# Patient Record
Sex: FEMALE | Race: WHITE | URBAN - METROPOLITAN AREA
[De-identification: names, ages, dates, MRNs, and addresses within clinical notes are randomized per-mention and may not be internally consistent; named-entity substitution may affect disease eponyms.]

---

## 2017-01-13 ENCOUNTER — TRANSFERRED RECORDS (OUTPATIENT)
Dept: HEALTH INFORMATION MANAGEMENT | Facility: CLINIC | Age: 42
End: 2017-01-13

## 2017-02-24 ENCOUNTER — TRANSFERRED RECORDS (OUTPATIENT)
Dept: HEALTH INFORMATION MANAGEMENT | Facility: CLINIC | Age: 42
End: 2017-02-24

## 2017-02-27 ENCOUNTER — TRANSFERRED RECORDS (OUTPATIENT)
Dept: HEALTH INFORMATION MANAGEMENT | Facility: CLINIC | Age: 42
End: 2017-02-27

## 2017-03-03 ENCOUNTER — TRANSFERRED RECORDS (OUTPATIENT)
Dept: HEALTH INFORMATION MANAGEMENT | Facility: CLINIC | Age: 42
End: 2017-03-03

## 2017-03-05 ENCOUNTER — TRANSFERRED RECORDS (OUTPATIENT)
Dept: HEALTH INFORMATION MANAGEMENT | Facility: CLINIC | Age: 42
End: 2017-03-05

## 2017-05-03 ENCOUNTER — TRANSFERRED RECORDS (OUTPATIENT)
Dept: HEALTH INFORMATION MANAGEMENT | Facility: CLINIC | Age: 42
End: 2017-05-03

## 2017-05-15 ENCOUNTER — MEDICAL CORRESPONDENCE (OUTPATIENT)
Dept: HEALTH INFORMATION MANAGEMENT | Facility: CLINIC | Age: 42
End: 2017-05-15

## 2017-05-16 ENCOUNTER — CARE COORDINATION (OUTPATIENT)
Dept: GASTROENTEROLOGY | Facility: CLINIC | Age: 42
End: 2017-05-16

## 2017-05-16 DIAGNOSIS — R10.9 ABDOMINAL PAIN: Primary | ICD-10-CM

## 2017-05-16 NOTE — LETTER
May 16, 2017       TO: Jeny Burciaga  [address] [facility]  [place] [address]         Dear Jeny,    Endoscopic Ultrasound Instructions    You are scheduled for and Endoscopic Ultrasound (EUS) with Dr. Steven on June 22, 2017. This will be in the [facility] hospital in the Operating Room on the 3rd floor at 7:30 a.m. .   You will need to arrive for your procedure at 5:30 a.m..    Please arrive at this address:  [address] [address]      EUS - is an ultrasound examination of the upper part of your gastro-intestinal (GI) tract and surrounding organs. This includes the esophagus (food tube), stomach, duodenum(first part of the bowel), the liver, pancreas, gallbladder, spleen, and lymph nodes in this area.     For scheduling questions or questions regarding procedure or the prep please call Endoscopy at [phone].     The following will need to be completed for the EUS:     1. Nothing by mouth (eat or drink) for 6-8 hours prior to exam.  2. You must have a  present the day of the exam, cabs and buses are not accepted as rides. If you do not have a proper ride at the time of the exam your exam will be canceled and rescheduled.   3. If you are currently taking a blood thinner: It is recommended that you stop taking your Coumadin/Warfarin or Plavix at least 5 days prior to exam, as long as it is ok with the prescribing MD.  If you are taking aspirin we prefer stopping the medication 3 days prior to procedure. Please check with your provider.  - If your MD switches you to Lovenox prior to exam we prefer the PM dose the evening before the exam and the AM dose the day of the exam are held.   4. If you are a diabetic please ask your primary care provider about adjusting your insulin for the procedure as you will not be able to eat or drink prior to the procedure.   5. If you have medications that need to be taken the morning of the procedure,   please take with small sips of water.   It is an anesthesia requirement to have a PRE-OP PHYSICAL by your Primary Care Clinic before receiving GENERAL ANESTHESIA. Without it, your procedure will be delayed or cancelled. The physical can be faxed to [phone].    Pre-Op Physical Fax Numbers:             [facility] Pre-Admissions      [address]      Fax: [phone]      Phone: [phone]      FOLLOW-UP, POST PROCEDURE:     Activity:  You should not drive, operate machinery, make important decisions, or do activities that require coordination or balance until the following day.    Abdominal Cramping:  It is normal to have mild cramping after the study.  This is due to air and water put into the intestines during the ultrasound.  Walking or turning side to side will usually help the air to pass.  Call if the pain is severe.    Sore Throat:  You may have a sore throat for one to two days.  Throat lozenges or warm saltwater gargles may help.  Mix   teaspoon salt in a glass of warm water, gargle, and then spit.    Feel free to contact me with any additional questions or concerns prior to this exam.  Cristela Verduzco RN   BSN, HNBC, STAR-T  Surgical Oncology and GI Service  Care Coordinator  Ph: [phone]

## 2017-05-16 NOTE — PROGRESS NOTES
Care Coordination New Patient Referral  Surgical Oncology and GI    Advanced Endoscopy Procedure Intake form:     Referring/Requesting provider and Health care System: Dr. Padma Bacon/[facility]     Clinic contact - Name, Phone and Fax number: [place] @ [phone]     Procedure Requested: EUS     Requested provided (if specified): Dr. Steven     Is this a procedure the provider does? (Look at procedure list): Yes  - inform referring clinic requested provider does not do this procedure and that it will be referred by provider that does offer the procedure.     Has patient been evaluated in clinic or had a procedure Advance Endoscopy provider in the last 5 years: No     Indication/Reason for procedure: Ongoing Abdominal Pain, Nausea     Is procedure to rule out malignancy or is there concern for underlying malignancy (if yes, send messages as High priority): Unknown     Requested urgency of procedure: Within the next month     History and physical within last 30 days? Yes-May 3rd     Has patient had any imaging of chest/abdominal completed? Yes- CT of abdomen and pelvis on 2/27/17     5/15/17 Dr. Steven has reviewed information and we will proceed with scheduling EUS first available time in the OR.  Dr. Steven will need to screen duodenum major papilla given greater than 50 colon polyps.    We will contact the patient to let them know the date and time of procedure.    Cristela Verduzco  BSN, HNBC  RN Care Coordinator  Surgical Oncology  Ph: [phone]  Email: [email or web address]

## 2017-06-21 ENCOUNTER — ANESTHESIA EVENT (OUTPATIENT)
Dept: SURGERY | Facility: CLINIC | Age: 42
End: 2017-06-21
Payer: COMMERCIAL

## 2017-06-21 RX ORDER — CETIRIZINE HYDROCHLORIDE 10 MG/1
10 TABLET ORAL DAILY PRN
COMMUNITY

## 2017-06-22 ENCOUNTER — ANESTHESIA (OUTPATIENT)
Dept: SURGERY | Facility: CLINIC | Age: 42
End: 2017-06-22
Payer: COMMERCIAL

## 2017-06-22 ENCOUNTER — HOSPITAL ENCOUNTER (OUTPATIENT)
Facility: CLINIC | Age: 42
Discharge: HOME | End: 2017-06-22
Attending: INTERNAL MEDICINE | Admitting: INTERNAL MEDICINE
Payer: COMMERCIAL

## 2017-06-22 VITALS
BODY MASS INDEX: 35.99 KG/M2 | TEMPERATURE: 97 F | RESPIRATION RATE: 16 BRPM | OXYGEN SATURATION: 95 % | SYSTOLIC BLOOD PRESSURE: 94 MMHG | DIASTOLIC BLOOD PRESSURE: 65 MMHG | HEIGHT: 67 IN | WEIGHT: 229.28 LBS | HEART RATE: 70 BPM

## 2017-06-22 PROBLEM — T88.52XA FAILED CONSCIOUS SEDATION DURING PROCEDURE: Status: ACTIVE | Noted: 2017-06-22

## 2017-06-22 LAB
ALBUMIN SERPL-MCNC: 3.5 G/DL (ref 3.4–5)
ALP SERPL-CCNC: 74 U/L (ref 40–150)
ALT SERPL W P-5'-P-CCNC: 32 U/L (ref 0–50)
ANION GAP SERPL CALCULATED.3IONS-SCNC: 4 MMOL/L (ref 3–14)
AST SERPL W P-5'-P-CCNC: 16 U/L (ref 0–45)
BILIRUB SERPL-MCNC: 0.3 MG/DL (ref 0.2–1.3)
BUN SERPL-MCNC: 7 MG/DL (ref 7–30)
CALCIUM SERPL-MCNC: 8.6 MG/DL (ref 8.5–10.1)
CHLORIDE SERPL-SCNC: 105 MMOL/L (ref 94–109)
CO2 SERPL-SCNC: 29 MMOL/L (ref 20–32)
CREAT SERPL-MCNC: 0.82 MG/DL (ref 0.52–1.04)
ERYTHROCYTE [DISTWIDTH] IN BLOOD BY AUTOMATED COUNT: 13.2 % (ref 10–15)
GFR SERPL CREATININE-BSD FRML MDRD: 77 ML/MIN/1.7M2
GLUCOSE SERPL-MCNC: 93 MG/DL (ref 70–99)
HCG UR QL: NEGATIVE
HCT VFR BLD AUTO: 38.2 % (ref 35–47)
HGB BLD-MCNC: 12.5 G/DL (ref 11.7–15.7)
INR PPP: 0.88 (ref 0.86–1.14)
MCH RBC QN AUTO: 30.3 PG (ref 26.5–33)
MCHC RBC AUTO-ENTMCNC: 32.7 G/DL (ref 31.5–36.5)
MCV RBC AUTO: 93 FL (ref 78–100)
PLATELET # BLD AUTO: 207 10E9/L (ref 150–450)
POTASSIUM SERPL-SCNC: 4 MMOL/L (ref 3.4–5.3)
PROT SERPL-MCNC: 7.3 G/DL (ref 6.8–8.8)
RBC # BLD AUTO: 4.13 10E12/L (ref 3.8–5.2)
SODIUM SERPL-SCNC: 138 MMOL/L (ref 133–144)
UPPER EUS: NORMAL
WBC # BLD AUTO: 5.8 10E9/L (ref 4–11)

## 2017-06-22 PROCEDURE — 25000125 ZZHC RX 250: Performed by: NURSE ANESTHETIST, CERTIFIED REGISTERED

## 2017-06-22 PROCEDURE — 25000125 ZZHC RX 250: Performed by: ANESTHESIOLOGY

## 2017-06-22 PROCEDURE — 36000053 ZZH SURGERY LEVEL 2 EA 15 ADDTL MIN - UMMC: Performed by: INTERNAL MEDICINE

## 2017-06-22 PROCEDURE — 27210794 ZZH OR GENERAL SUPPLY STERILE: Performed by: INTERNAL MEDICINE

## 2017-06-22 PROCEDURE — 25000128 H RX IP 250 OP 636: Performed by: NURSE ANESTHETIST, CERTIFIED REGISTERED

## 2017-06-22 PROCEDURE — C9399 UNCLASSIFIED DRUGS OR BIOLOG: HCPCS | Performed by: NURSE ANESTHETIST, CERTIFIED REGISTERED

## 2017-06-22 PROCEDURE — 25000125 ZZHC RX 250: Performed by: INTERNAL MEDICINE

## 2017-06-22 PROCEDURE — 85610 PROTHROMBIN TIME: CPT | Performed by: INTERNAL MEDICINE

## 2017-06-22 PROCEDURE — 81025 URINE PREGNANCY TEST: CPT | Performed by: ANESTHESIOLOGY

## 2017-06-22 PROCEDURE — 25000132 ZZH RX MED GY IP 250 OP 250 PS 637: Performed by: ANESTHESIOLOGY

## 2017-06-22 PROCEDURE — 71000014 ZZH RECOVERY PHASE 1 LEVEL 2 FIRST HR: Performed by: INTERNAL MEDICINE

## 2017-06-22 PROCEDURE — 25000566 ZZH SEVOFLURANE, EA 15 MIN: Performed by: INTERNAL MEDICINE

## 2017-06-22 PROCEDURE — 71000027 ZZH RECOVERY PHASE 2 EACH 15 MINS: Performed by: INTERNAL MEDICINE

## 2017-06-22 PROCEDURE — 85027 COMPLETE CBC AUTOMATED: CPT | Performed by: INTERNAL MEDICINE

## 2017-06-22 PROCEDURE — 88305 TISSUE EXAM BY PATHOLOGIST: CPT | Performed by: INTERNAL MEDICINE

## 2017-06-22 PROCEDURE — 25000128 H RX IP 250 OP 636: Performed by: ANESTHESIOLOGY

## 2017-06-22 PROCEDURE — 36415 COLL VENOUS BLD VENIPUNCTURE: CPT | Performed by: INTERNAL MEDICINE

## 2017-06-22 PROCEDURE — 80053 COMPREHEN METABOLIC PANEL: CPT | Performed by: INTERNAL MEDICINE

## 2017-06-22 PROCEDURE — 36000051 ZZH SURGERY LEVEL 2 1ST 30 MIN - UMMC: Performed by: INTERNAL MEDICINE

## 2017-06-22 PROCEDURE — 37000009 ZZH ANESTHESIA TECHNICAL FEE, EACH ADDTL 15 MIN: Performed by: INTERNAL MEDICINE

## 2017-06-22 PROCEDURE — 37000008 ZZH ANESTHESIA TECHNICAL FEE, 1ST 30 MIN: Performed by: INTERNAL MEDICINE

## 2017-06-22 PROCEDURE — 71000015 ZZH RECOVERY PHASE 1 LEVEL 2 EA ADDTL HR: Performed by: INTERNAL MEDICINE

## 2017-06-22 PROCEDURE — 40000170 ZZH STATISTIC PRE-PROCEDURE ASSESSMENT II: Performed by: INTERNAL MEDICINE

## 2017-06-22 RX ORDER — LIDOCAINE 40 MG/G
CREAM TOPICAL
Status: DISCONTINUED | OUTPATIENT
Start: 2017-06-22 | End: 2017-06-22 | Stop reason: HOSPADM

## 2017-06-22 RX ORDER — GLYCOPYRROLATE 0.2 MG/ML
INJECTION, SOLUTION INTRAMUSCULAR; INTRAVENOUS PRN
Status: DISCONTINUED | OUTPATIENT
Start: 2017-06-22 | End: 2017-06-22

## 2017-06-22 RX ORDER — PROPOFOL 10 MG/ML
INJECTION, EMULSION INTRAVENOUS PRN
Status: DISCONTINUED | OUTPATIENT
Start: 2017-06-22 | End: 2017-06-22

## 2017-06-22 RX ORDER — FENTANYL CITRATE 50 UG/ML
INJECTION, SOLUTION INTRAMUSCULAR; INTRAVENOUS PRN
Status: DISCONTINUED | OUTPATIENT
Start: 2017-06-22 | End: 2017-06-22

## 2017-06-22 RX ORDER — MEPERIDINE HYDROCHLORIDE 25 MG/ML
12.5 INJECTION INTRAMUSCULAR; INTRAVENOUS; SUBCUTANEOUS
Status: DISCONTINUED | OUTPATIENT
Start: 2017-06-22 | End: 2017-06-22 | Stop reason: HOSPADM

## 2017-06-22 RX ORDER — NALOXONE HYDROCHLORIDE 0.4 MG/ML
.1-.4 INJECTION, SOLUTION INTRAMUSCULAR; INTRAVENOUS; SUBCUTANEOUS
Status: DISCONTINUED | OUTPATIENT
Start: 2017-06-22 | End: 2017-06-22 | Stop reason: HOSPADM

## 2017-06-22 RX ORDER — ONDANSETRON 2 MG/ML
INJECTION INTRAMUSCULAR; INTRAVENOUS PRN
Status: DISCONTINUED | OUTPATIENT
Start: 2017-06-22 | End: 2017-06-22

## 2017-06-22 RX ORDER — FLUMAZENIL 0.1 MG/ML
0.2 INJECTION, SOLUTION INTRAVENOUS
Status: DISCONTINUED | OUTPATIENT
Start: 2017-06-22 | End: 2017-06-22 | Stop reason: HOSPADM

## 2017-06-22 RX ORDER — ONDANSETRON 4 MG/1
4 TABLET, ORALLY DISINTEGRATING ORAL EVERY 30 MIN PRN
Status: DISCONTINUED | OUTPATIENT
Start: 2017-06-22 | End: 2017-06-22 | Stop reason: HOSPADM

## 2017-06-22 RX ORDER — NEOSTIGMINE METHYLSULFATE 1 MG/ML
VIAL (ML) INJECTION PRN
Status: DISCONTINUED | OUTPATIENT
Start: 2017-06-22 | End: 2017-06-22

## 2017-06-22 RX ORDER — SODIUM CHLORIDE, SODIUM LACTATE, POTASSIUM CHLORIDE, CALCIUM CHLORIDE 600; 310; 30; 20 MG/100ML; MG/100ML; MG/100ML; MG/100ML
INJECTION, SOLUTION INTRAVENOUS CONTINUOUS
Status: DISCONTINUED | OUTPATIENT
Start: 2017-06-22 | End: 2017-06-22 | Stop reason: HOSPADM

## 2017-06-22 RX ORDER — ONDANSETRON 2 MG/ML
4 INJECTION INTRAMUSCULAR; INTRAVENOUS EVERY 30 MIN PRN
Status: DISCONTINUED | OUTPATIENT
Start: 2017-06-22 | End: 2017-06-22 | Stop reason: HOSPADM

## 2017-06-22 RX ORDER — FENTANYL CITRATE 50 UG/ML
25-50 INJECTION, SOLUTION INTRAMUSCULAR; INTRAVENOUS
Status: DISCONTINUED | OUTPATIENT
Start: 2017-06-22 | End: 2017-06-22 | Stop reason: HOSPADM

## 2017-06-22 RX ORDER — LEVOFLOXACIN 5 MG/ML
INJECTION, SOLUTION INTRAVENOUS PRN
Status: DISCONTINUED | OUTPATIENT
Start: 2017-06-22 | End: 2017-06-22

## 2017-06-22 RX ORDER — LIDOCAINE HYDROCHLORIDE 20 MG/ML
INJECTION, SOLUTION INFILTRATION; PERINEURAL PRN
Status: DISCONTINUED | OUTPATIENT
Start: 2017-06-22 | End: 2017-06-22

## 2017-06-22 RX ADMIN — PROCHLORPERAZINE EDISYLATE 5 MG: 5 INJECTION INTRAMUSCULAR; INTRAVENOUS at 10:23

## 2017-06-22 RX ADMIN — FENTANYL CITRATE 50 MCG: 50 INJECTION, SOLUTION INTRAMUSCULAR; INTRAVENOUS at 09:13

## 2017-06-22 RX ADMIN — MIDAZOLAM HYDROCHLORIDE 2 MG: 1 INJECTION, SOLUTION INTRAMUSCULAR; INTRAVENOUS at 07:17

## 2017-06-22 RX ADMIN — SUGAMMADEX 100 MG: 100 INJECTION, SOLUTION INTRAVENOUS at 08:06

## 2017-06-22 RX ADMIN — Medication 2 MG: at 08:04

## 2017-06-22 RX ADMIN — SODIUM CHLORIDE, POTASSIUM CHLORIDE, SODIUM LACTATE AND CALCIUM CHLORIDE: 600; 310; 30; 20 INJECTION, SOLUTION INTRAVENOUS at 06:56

## 2017-06-22 RX ADMIN — FENTANYL CITRATE 50 MCG: 50 INJECTION, SOLUTION INTRAMUSCULAR; INTRAVENOUS at 08:52

## 2017-06-22 RX ADMIN — LIDOCAINE HYDROCHLORIDE 3 ML: 40 INJECTION, SOLUTION RETROBULBAR; TOPICAL at 08:47

## 2017-06-22 RX ADMIN — GLYCOPYRROLATE 0.4 MG: 0.2 INJECTION, SOLUTION INTRAMUSCULAR; INTRAVENOUS at 08:04

## 2017-06-22 RX ADMIN — LIDOCAINE HYDROCHLORIDE 100 MG: 20 INJECTION, SOLUTION INFILTRATION; PERINEURAL at 07:20

## 2017-06-22 RX ADMIN — GLYCOPYRROLATE 0.4 MG: 0.2 INJECTION, SOLUTION INTRAMUSCULAR; INTRAVENOUS at 08:05

## 2017-06-22 RX ADMIN — FENTANYL CITRATE 100 MCG: 50 INJECTION, SOLUTION INTRAMUSCULAR; INTRAVENOUS at 07:37

## 2017-06-22 RX ADMIN — FENTANYL CITRATE 50 MCG: 50 INJECTION, SOLUTION INTRAMUSCULAR; INTRAVENOUS at 08:38

## 2017-06-22 RX ADMIN — Medication 1 MG: at 08:11

## 2017-06-22 RX ADMIN — PROPOFOL 200 MG: 10 INJECTION, EMULSION INTRAVENOUS at 07:20

## 2017-06-22 RX ADMIN — ROCURONIUM BROMIDE 40 MG: 10 INJECTION INTRAVENOUS at 07:20

## 2017-06-22 RX ADMIN — ONDANSETRON 4 MG: 2 INJECTION INTRAMUSCULAR; INTRAVENOUS at 07:49

## 2017-06-22 RX ADMIN — LEVOFLOXACIN 500 MG: 5 INJECTION, SOLUTION INTRAVENOUS at 07:34

## 2017-06-22 RX ADMIN — Medication 2 MG: at 08:05

## 2017-06-22 RX ADMIN — GLYCOPYRROLATE 0.2 MG: 0.2 INJECTION, SOLUTION INTRAMUSCULAR; INTRAVENOUS at 08:11

## 2017-06-22 RX ADMIN — SODIUM CHLORIDE, POTASSIUM CHLORIDE, SODIUM LACTATE AND CALCIUM CHLORIDE: 600; 310; 30; 20 INJECTION, SOLUTION INTRAVENOUS at 10:24

## 2017-06-22 RX ADMIN — ONDANSETRON 4 MG: 2 INJECTION INTRAMUSCULAR; INTRAVENOUS at 09:40

## 2017-06-22 ASSESSMENT — PAIN DESCRIPTION - DESCRIPTORS
DESCRIPTORS: SORE
DESCRIPTORS: SORE
DESCRIPTORS: ACHING;DISCOMFORT
DESCRIPTORS: SORE

## 2017-06-22 NOTE — DISCHARGE INSTRUCTIONS
Take it easy when you get home.  Remember, same day surgery DOES NOT MEAN SAME DAY RECOVERY!  Healing is a gradual process.  You will need some time to recover - you may be more tired than you realize at first.  Rest and relax for at least the first 24 hours at home.  You'll feel better and heal faster if you take good care of yourself.    [facility], [place]  Same-Day Surgery   Adult Discharge Orders & Instructions     For 24 hours after surgery    1. Get plenty of rest.  A responsible adult must stay with you for at least 24 hours after you leave the hospital.   2. Do not drive or use heavy equipment.  If you have weakness or tingling, don't drive or use heavy equipment until this feeling goes away.  3. Do not drink alcohol.  4. Avoid strenuous or risky activities.  Ask for help when climbing stairs.   5. You may feel lightheaded.  IF so, sit for a few minutes before standing.  Have someone help you get up.   6. If you have nausea (feel sick to your stomach): Drink only clear liquids such as apple juice, ginger ale, broth or 7-Up.  Rest may also help.  Be sure to drink enough fluids.  Move to a regular diet as you feel able.  7. You may have a slight fever. Call the doctor if your fever is over 100 F (37.7 C) (taken under the tongue) or lasts longer than 24 hours.  8. You may have a dry mouth, a sore throat, muscle aches or trouble sleeping.  These should go away after 24 hours.  9. Do not make important or legal decisions.   Call your doctor for any of the followin.  Signs of infection (fever, growing tenderness at the surgery site, a large amount of drainage or bleeding, severe pain, foul-smelling drainage, redness, swelling).    2. It has been over 8 to 10 hours since surgery and you are still not able to urinate (pass water).    3.  Headache for over 24 hours.      To contact a doctor, call Dr Steven's office at [phone] Gastroenterology or:        [phone] and ask  for the resident on call for Gastroenterology (answered 24 hours a day)      Emergency Department:    [facility]: [phone]       (TTY for hearing impaired: [phone])

## 2017-06-22 NOTE — IP AVS SNAPSHOT
MRN:[id number]                      After Visit Summary   6/22/2017    Jeny Burciaga    MRN: [id number]           Thank you!     Thank you for choosing [facility] for your care. Our goal is always to provide you with excellent care. Hearing back from our patients is one way we can continue to improve our services. Please take a few minutes to complete the written survey that you may receive in the mail after you visit with us. Thank you!        Patient Information     Date Of Birth          1975        About your hospital stay     You were admitted on:  June 22, 2017 You last received care in the:  Same Day Surgery [facility]    You were discharged on:  June 22, 2017       Who to Call     For medical emergencies, please call 911.  For non-urgent questions about your medical care, please call your primary care provider or clinic, [phone]  For questions related to your surgery, please call your surgery clinic        Attending Provider     Provider Leoncio Rg MD Gastroenterology       Primary Care Provider Office Phone # Fax #    Yu Cam [phone]      After Care Instructions     Discharge Instructions       No driving or operating machinery until the day after procedure.            Discharge Instructions       Recommend that a responsible adult remain with the patient at home for 24 hours post discharge.            Discharge Instructions       Start with clear liquids, sips of water 1 hour after procedure. If no abdominal pain and gag reflex has returned, advance as tolerated to pre-procedure diet.              Discharge Instructions       Restart home medications.            Discharge Instructions       Check with your Provider when to start anticoagulant medication.            Discharge Instructions       No ALCOHOL 24 hours post procedure.                  Further instructions from your care team       Take it easy when you get home.   Remember, same day surgery DOES NOT MEAN SAME DAY RECOVERY!  Healing is a gradual process.  You will need some time to recover - you may be more tired than you realize at first.  Rest and relax for at least the first 24 hours at home.  You'll feel better and heal faster if you take good care of yourself.    [facility], [place]  Same-Day Surgery   Adult Discharge Orders & Instructions     For 24 hours after surgery    1. Get plenty of rest.  A responsible adult must stay with you for at least 24 hours after you leave the hospital.   2. Do not drive or use heavy equipment.  If you have weakness or tingling, don't drive or use heavy equipment until this feeling goes away.  3. Do not drink alcohol.  4. Avoid strenuous or risky activities.  Ask for help when climbing stairs.   5. You may feel lightheaded.  IF so, sit for a few minutes before standing.  Have someone help you get up.   6. If you have nausea (feel sick to your stomach): Drink only clear liquids such as apple juice, ginger ale, broth or 7-Up.  Rest may also help.  Be sure to drink enough fluids.  Move to a regular diet as you feel able.  7. You may have a slight fever. Call the doctor if your fever is over 100 F (37.7 C) (taken under the tongue) or lasts longer than 24 hours.  8. You may have a dry mouth, a sore throat, muscle aches or trouble sleeping.  These should go away after 24 hours.  9. Do not make important or legal decisions.   Call your doctor for any of the followin.  Signs of infection (fever, growing tenderness at the surgery site, a large amount of drainage or bleeding, severe pain, foul-smelling drainage, redness, swelling).    2. It has been over 8 to 10 hours since surgery and you are still not able to urinate (pass water).    3.  Headache for over 24 hours.      To contact a doctor, call Dr Steven's office at [phone] Gastroenterology or:        [phone] and ask for the resident on call for  "Gastroenterology (answered 24 hours a day)      Emergency Department:    [facility]: [phone]       (TTY for hearing impaired: [phone])              Additional Information     If you use hormonal birth control (such as the pill, patch, ring or implants): You'll need a second form of birth control for 7 days (condoms, a diaphragm or contraceptive foam). While in the hospital, you received a medicine called Bridion. Your normal birth control will not work as well for a week after taking this medicine.          Pending Results     No orders found from 2017 to 2017.            Admission Information     Date & Time Provider Department Dept. Phone    2017 Leoncio Steven MD Same Day Surgery [facility] [place] [phone]      Your Vitals Were     Blood Pressure Pulse Temperature Respirations Height Weight    101/81 70 96.3  F (35.7  C) (Temporal) 16 1.702 m (5' 7\") 104 kg (229 lb 4.5 oz)    Last Period Pulse Oximetry BMI (Body Mass Index)             2017 95% 35.91 kg/m2         [employer] Information     [employer] lets you send messages to your doctor, view your test results, renew your prescriptions, schedule appointments and more. To sign up, go to [email or web address] . Click on \"Log in\" on the left side of the screen, which will take you to the Welcome page. Then click on \"Sign up Now\" on the right side of the page.     You will be asked to enter the access code listed below, as well as some personal information. Please follow the directions to create your username and password.     Your access code is: [email or web address]  Expires: 2017  6:13 AM     Your access code will  in 90 days. If you need help or a new code, please call your [facility] clinic or [phone].        Care EveryWhere ID     This is your Care EveryWhere ID. This could be used by other organizations to access your [facility] medical records  [id number]        Equal Access to Services     RAMON BURROUGHS AH: " Hadii aad ku vidyao Soomaali, waaxda luqadaha, qaybta kaalmada adesarah, carlo georgiain hayaan candismario rodriguez david kojo. So [facility] [phone].    ATENCIÓN: Si darrell jacobs, tiene a gonsales disposición servicios gratuitos de asistencia lingüística. Llame al [phone].    We comply with applicable federal civil rights laws and [place] laws. We do not discriminate on the basis of race, color, national origin, age, disability sex, sexual orientation or gender identity.               Review of your medicines      CONTINUE these medicines which have NOT CHANGED        Dose / Directions    cetirizine 10 MG tablet   Commonly known as:  zyrTEC        Dose:  10 mg   Take 10 mg by mouth daily as needed for allergies   Refills:  0       DULOXETINE HCL PO        Dose:  60 mg   Take 60 mg by mouth daily   Refills:  0       LORAZEPAM PO        Dose:  0.5-1 mg   Take 0.5-1 mg by mouth every 8 hours as needed for anxiety   Refills:  0       MELATONIN PO        Dose:  5 mg   Take 5 mg by mouth daily   Refills:  0       OMEPRAZOLE PO        Dose:  40 mg   Take 40 mg by mouth 2 times daily (before meals)   Refills:  0       SUCRALFATE PO        Dose:  1000 mg   Take 1,000 mg by mouth 2 times daily   Refills:  0                Protect others around you: Learn how to safely use, store and throw away your medicines at www.disposemymeds.org.             Medication List: This is a list of all your medications and when to take them. Check marks below indicate your daily home schedule. Keep this list as a reference.      Medications           Morning Afternoon Evening Bedtime As Needed    cetirizine 10 MG tablet   Commonly known as:  zyrTEC   Take 10 mg by mouth daily as needed for allergies                                DULOXETINE HCL PO   Take 60 mg by mouth daily                                LORAZEPAM PO   Take 0.5-1 mg by mouth every 8 hours as needed for anxiety                                MELATONIN PO   Take 5 mg by mouth daily                                 OMEPRAZOLE PO   Take 40 mg by mouth 2 times daily (before meals)                                SUCRALFATE PO   Take 1,000 mg by mouth 2 times daily

## 2017-06-22 NOTE — ANESTHESIA POSTPROCEDURE EVALUATION
Patient: Jeny Burciaga    Procedure(s):  Upper Endoscopy with biopsies - Wound Class: II-Clean Contaminated  Upper Endoscopic Ultrasound  - Wound Class: II-Clean Contaminated    Diagnosis:Abdominal Pain  Diagnosis Additional Information: No value filed.    Anesthesia Type:  General, ETT    Note:  Anesthesia Post Evaluation    Patient location during evaluation: PACU       Comments: Counseled re: previous failed midaz based anesthetics.  Resistant and possibly paradox effect from said drugs.  Reinterviewed immed postop without family present: insists infreq etoh and no recreational drug use        Last vitals:  Vitals:    06/22/17 1008 06/22/17 1030 06/22/17 1040   BP: 110/74 101/81    Pulse: 70     Resp: 14 16    Temp: 35.7  C (96.3  F)     SpO2: 92% 91% 95%         Electronically Signed By: Alexandro Gilbert MD  June 22, 2017  10:59 AM

## 2017-06-22 NOTE — ANESTHESIA PREPROCEDURE EVALUATION
"      ANESTHESIA PREOP EVALUATION    Procedure: Procedure(s):  Upper Endoscopy with Side Viewer, Upper Endoscopic Ultrasound  - Wound Class: II-Clean Contaminated   - Wound Class: II-Clean Contaminated    HPI: Jeny Burciaga is a 42 year old female presenting for above procedure.    PMHx/PSHx/ROS:  Past Medical History:   Diagnosis Date     Anxiety and depression      Colon polyps      Pancreatic disease     pancreatic cyst     Renal disease     kidney stones       Past Surgical History:   Procedure Laterality Date     CHOLECYSTECTOMY       COLONOSCOPY       GENITOURINARY SURGERY      ureter stent     GYN SURGERY       x2, Tubal ligation     ORTHOPEDIC SURGERY      Left knee arthroscopy     RELEASE CARPAL TUNNEL      right         Past Anes Hx: No personal or family h/o anesthesia problems    Soc Hx:   Social History   Substance Use Topics     Smoking status: Current Every Day Smoker     Packs/day: 1.00     Years: 26.00     Types: Cigarettes     Smokeless tobacco: Not on file     Alcohol use Yes      Comment: Rare       Allergies:   Allergies   Allergen Reactions     Amoxicillin Hives     Penicillins Hives       Meds:   Prescriptions Prior to Admission   Medication Sig Dispense Refill Last Dose     DULOXETINE HCL PO Take 60 mg by mouth daily   2017     LORAZEPAM PO Take 0.5-1 mg by mouth every 8 hours as needed for anxiety   Past Month at Unknown time     MELATONIN PO Take 5 mg by mouth daily   2017     OMEPRAZOLE PO Take 40 mg by mouth 2 times daily (before meals)   2017 at 1800     SUCRALFATE PO Take 1,000 mg by mouth 2 times daily   Past Week at Unknown time     cetirizine (ZYRTEC) 10 MG tablet Take 10 mg by mouth daily as needed for allergies   More than a month at Unknown time       No current outpatient prescriptions on file.       Physical Exam:  Vitals: BP 98/69  Temp 36.6  C (97.9  F) (Oral)  Resp 16  Ht 1.702 m (5' 7\")  Wt 104 kg (229 lb 4.5 oz)  LMP 2017  SpO2 98%  BMI " 35.91 kg/m2  BMI= Body mass index is 35.91 kg/(m^2).      Labs:  UPT: No results found for: HCGQUANT      BMP:  No results for input(s): NA, POTASSIUM, CHLORIDE, CO2, BUN, CR, GLC, TREVOR in the last 02379 hours.  CBC:   Recent Labs   Lab Test  06/22/17   0619   WBC  5.8   RBC  4.13   HGB  12.5   HCT  38.2   MCV  93   MCH  30.3   MCHC  32.7   RDW  13.2   PLT  207     Coags:  No results for input(s): INR, PTT, FIBR in the last 99637 hours.              Anesthesia Evaluation     .             ROS/MED HX    ENT/Pulmonary:  - neg pulmonary ROS     Neurologic:  - neg neurologic ROS     Cardiovascular:  - neg cardiovascular ROS       METS/Exercise Tolerance:     Hematologic:  - neg hematologic  ROS       Musculoskeletal:  - neg musculoskeletal ROS       GI/Hepatic: Comment: Current eval pancreatic cyst - neg GI/hepatic ROS   (+) GERD       Renal/Genitourinary:     (+) Nephrolithiasis ,       Endo:  - neg endo ROS   (+) Obesity, .      Psychiatric:  - neg psychiatric ROS   (+) psychiatric history depression      Infectious Disease:  - neg infectious disease ROS       Malignancy:      - no malignancy   Other:                     Physical Exam  Normal systems: dental    Airway     Dental     Cardiovascular   Rhythm and rate: regular and normal      Pulmonary    breath sounds clear to auscultation                    Anesthesia Plan      History & Physical Review  History and physical reviewed and following examination; no interval change.    ASA Status:  3 .    NPO Status:  > 8 hours    Plan for General and ETT with Intravenous induction.          Postoperative Care  Postoperative pain management:  IV analgesics.      Consents  Anesthetic plan, risks, benefits and alternatives discussed with:  Patient..                          .

## 2017-06-22 NOTE — PROGRESS NOTES
Dr Gilbert notified that pt is ready for anesthesia sign-out.  OK to transfer pt to Phase 2 while awaiting sign-out.

## 2017-06-22 NOTE — LETTER
"    Patient:  Loretta Abad  :   1975  MRN:     [id number]        Ms.Penny Abad  [address] [address] [facility] [address]        2017    Dear ,    We are writing to inform you of your test results. As you may recall, two tiny spots were seen in the duodenum that I removed. The pathology show mild acid-related irritation (which does not require specific therapy) and no evidence of polyp tissue.    Given your history of numerous colon polyps, I would recommend a repeat upper endoscopy in 4 years. My office will coordinate this.    Resulted Orders   Surgical pathology exam   Result Value Ref Range    Copath Report       Patient Name: LORETTA ABAD  MR#: [id number]  Specimen #: [id number]  Collected: 2017  Received: 2017  Reported: 2017 15:58  Ordering Phy(s): VIRGIL RANDHAWA    For improved result formatting, select 'View Enhanced Report Format'  under Linked Documents section.    SPECIMEN(S):  Duodenal polyps    FINAL DIAGNOSIS:  DUODENAL POLYPS, POLYPECTOMY X 2:  - Fragments of duodenal mucosa with focal foveolar metaplasia and  reactive atypia (peptic duodenitis)  - No evidence of neoplastic polyp or malignancy    I have personally reviewed all specimens and or slides, including the  listed special stains, and used them with my medical judgement to  determine the final diagnosis.    Electronically signed out by:  Alvarez Mcelroy M.D., [facility]    CLINICAL HISTORY:  43 yo female with strong family history of colon cancer. FAP mutation of  undetermined significance. Recent colonoscopy with >50 adenomatous  polyps.    GROSS:  The specimen is received in formalin with proper patien t identification,  labeled \"duodenal polyp x2\".  The specimen consists of five pink-white  soft tissue fragments 0.1-0.3 cm in greatest dimension.  The specimen is  wrapped and entirely submitted (wrapped) in cassette A1. (Dictated by:  Teresa Fatima [facility] 2017 03:09 " PM)    MICROSCOPIC:  Microscopic examination was performed.    CPT Codes:  A: 67926-BT2    TESTING LAB LOCATION:  [facility], [address]   [id number] [phone]    COLLECTION SITE:  Client: [facility]  Location: [facility] (B)       Please feel free to call if you have any questions about this letter. I can be reached at [phone].    SHIVANI Steven MD  Associate Professor of Medicine  Division of Gastroenterology, Hepatology and Nutrition  [facility]

## 2017-06-22 NOTE — IP AVS SNAPSHOT
Same Day Surgery [address] [address]    Phone:  [phone]                                       After Visit Summary   6/22/2017    Jeny Burciaga    MRN: [id number]           After Visit Summary Signature Page     I have received my discharge instructions, and my questions have been answered. I have discussed any challenges I see with this plan with the nurse or doctor.    ..........................................................................................................................................  Patient/Patient Representative Signature      ..........................................................................................................................................  Patient Representative Print Name and Relationship to Patient    ..................................................               ................................................  Date                                            Time    ..........................................................................................................................................  Reviewed by Signature/Title    ...................................................              ..............................................  Date                                                            Time

## 2017-06-22 NOTE — OR NURSING
Dr. Steven at bedside. Patient very nauseated, dry heaving. Dr. Steven would like this RN to give compazine and monitor patient in phase 2 until nausea lessens.

## 2017-06-22 NOTE — BRIEF OP NOTE
[facility] Brief Operative Note    Pre-operative diagnosis: Abdominal Pain   Post-operative diagnosis * Abdominal pain, small pancreatic cyst, equivocal duodenal polyps, pancreas divisum*   Procedure: Procedure(s):  Upper Endoscopy with biopsies - Wound Class: II-Clean Contaminated  Upper Endoscopic Ultrasound  - Wound Class: II-Clean Contaminated   Surgeon: Leoncio Steven MD   Assistants(s): None   Estimated blood loss: Less than 10 ml    Specimens: Forceps polypectomy of 2 tiny possible duodenal polyps.   Findings: EGD with normal esophagus and stomach.  2 shirley 2-3 mm equivocal polyps in proximal second portion of duodenum. Not in region of major or minor papillae. Removed with large biopsy forceps.  Duodenoscope used for normal major papilla exam.    EUS with tiny 3 mm simple cyst in proximal pancreatic body near neck. Smaller than lesion on CT. No cyst found to correlate with lesion on CT, however vague heterogenous hypoechoic tissue measuring 7 x 3 mm in this region which likely represents focal fat. DDx includes atypical appearance of small serous cystadenoma of no clinical significance.    Findings suggestive of pancreas divisum.  Other than cyst, normal-appearing pancreas without features of chronic pancreatitis.    Normal common bile duct post-cholecystectomy.  Normal liver, left adrenal.  Incidental small calcification in spleen suggestive of past granulomatous disease.    No adenopathy.    SHIVANI Steven MD  Associate Professor of Medicine  Division of Gastroenterology, Hepatology and Nutrition  [facility]

## 2017-06-22 NOTE — PROGRESS NOTES
While awaiting for report from Phase 2 RN, pt became nauseous and started dry heaving.  4mg IV Zofran provided.  Will continue to monitor pt in PACU until nausea decreases.

## 2017-06-26 LAB — COPATH REPORT: NORMAL

## 2017-06-27 ENCOUNTER — TELEPHONE (OUTPATIENT)
Dept: GASTROENTEROLOGY | Facility: CLINIC | Age: 42
End: 2017-06-27

## 2017-06-27 NOTE — TELEPHONE ENCOUNTER
See EGD report. Please arrange for repeat EGD with sideviewing endoscope in 4 years in [address] with IV sedation.    SHIVANI Steven MD  Associate Professor of Medicine  Division of Gastroenterology, Hepatology and Nutrition  [facility]

## 2021-07-30 ENCOUNTER — TRANSFERRED RECORDS (OUTPATIENT)
Dept: HEALTH INFORMATION MANAGEMENT | Facility: CLINIC | Age: 46
End: 2021-07-30

## 2021-10-20 ENCOUNTER — PATIENT OUTREACH (OUTPATIENT)
Dept: GASTROENTEROLOGY | Facility: CLINIC | Age: 46
End: 2021-10-20

## 2021-10-20 DIAGNOSIS — Z80.0 FAMILY HISTORY OF COLON CANCER: Primary | ICD-10-CM

## 2021-10-20 DIAGNOSIS — Z80.0 FAMILY HISTORY OF PANCREATIC CANCER: ICD-10-CM

## 2021-10-20 NOTE — PROGRESS NOTES
Called patient to discuss 4 year follow up procedure with Dr Steven EGD with IV sedation. Left VM    Order placed for Endo gastro procedure    Message sent to Endo     Beth Gunn RN, BSN,   [facility]  Care coordinator

## 2021-11-17 ENCOUNTER — PATIENT OUTREACH (OUTPATIENT)
Dept: GASTROENTEROLOGY | Facility: CLINIC | Age: 46
End: 2021-11-17
Payer: COMMERCIAL

## 2021-11-17 NOTE — PROGRESS NOTES
Second attempt at reaching patient to discuss 4 year follow up procedure with Dr Steven EGD with IV sedation. Left VM  Letter sent    Beth Gunn RN, BSN,   [facility]  Care coordinator

## (undated) DEVICE — SOL WATER IRRIG 1000ML BOTTLE 2F7114

## (undated) DEVICE — ENDO FORCEP BX CAPTURA LG SPIKE 2.4MMX230CM G53641

## (undated) DEVICE — ENDO TUBING CO2 SMARTCAP STERILE DISP 100145CO2EXT

## (undated) DEVICE — TAPE DURAPORE 3" SILK 1538-3

## (undated) DEVICE — ENDO BITE BLOCK ADULT OMNI-BLOC

## (undated) DEVICE — KIT ENDO FIRST STEP DISINFECTANT 200ML W/POUCH EP-4

## (undated) DEVICE — SUCTION MANIFOLD DORNOCH ULTRA CART UL-CL500

## (undated) RX ORDER — LEVOFLOXACIN 5 MG/ML
INJECTION, SOLUTION INTRAVENOUS
Status: DISPENSED
Start: 2017-06-22

## (undated) RX ORDER — LIDOCAINE HYDROCHLORIDE 40 MG/ML
INJECTION, SOLUTION RETROBULBAR
Status: DISPENSED
Start: 2017-06-22

## (undated) RX ORDER — LIDOCAINE HYDROCHLORIDE 20 MG/ML
INJECTION, SOLUTION EPIDURAL; INFILTRATION; INTRACAUDAL; PERINEURAL
Status: DISPENSED
Start: 2017-06-22

## (undated) RX ORDER — FENTANYL CITRATE 50 UG/ML
INJECTION, SOLUTION INTRAMUSCULAR; INTRAVENOUS
Status: DISPENSED
Start: 2017-06-22

## (undated) RX ORDER — ONDANSETRON 2 MG/ML
INJECTION INTRAMUSCULAR; INTRAVENOUS
Status: DISPENSED
Start: 2017-06-22

## (undated) RX ORDER — PROPOFOL 10 MG/ML
INJECTION, EMULSION INTRAVENOUS
Status: DISPENSED
Start: 2017-06-22

## (undated) RX ORDER — SIMETHICONE 40MG/0.6ML
SUSPENSION, DROPS(FINAL DOSAGE FORM)(ML) ORAL
Status: DISPENSED
Start: 2017-06-22